# Patient Record
(demographics unavailable — no encounter records)

---

## 2024-10-17 NOTE — PHYSICAL EXAM
[Alert] : alert [Oriented x 3] : ~L oriented x 3 [Well Nourished] : well nourished [Conjunctiva Non-injected] : conjunctiva non-injected [No Visual Lymphadenopathy] : no visual  lymphadenopathy [No Clubbing] : no clubbing [No Edema] : no edema [No Bromhidrosis] : no bromhidrosis [No Chromhidrosis] : no chromhidrosis [FreeTextEntry3] : L lateral forehead - 0.7x0.7 cm pink atrophic bx site

## 2024-10-17 NOTE — HISTORY OF PRESENT ILLNESS
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 Barahona   We had the pleasure of seeing your patient in consultation for Mohs Micrographic Surgery. Mr. 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 Moraels Hx of Mohs 20 years ago and 40 years ago Lives on [place], but able to drive now   Pertinent positives noted below History of HIV or hepatitis: No Blood thinners: ASA 81  Antibiotic Prophylaxis: None Medical implants: None   The patient's review of systems questionnaire was reviewed. Education needs were identified. There were no barriers to learning.

## 2024-10-17 NOTE — HISTORY OF PRESENT ILLNESS
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 Barahona   We had the pleasure of seeing your patient in consultation for Mohs Micrographic Surgery. Mr. 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 Morales Hx of Mohs 20 years ago and 40 years ago Lives on [place], but able to drive now   Pertinent positives noted below History of HIV or hepatitis: No Blood thinners: ASA 81  Antibiotic Prophylaxis: None Medical implants: None   The patient's review of systems questionnaire was reviewed. Education needs were identified. There were no barriers to learning.

## 2025-05-14 NOTE — HISTORY OF PRESENT ILLNESS
[FreeTextEntry1] : Patient is a 73yo M with CAD s/p CABG (LIMA to LAD, SVG to OM and diagonal) 2017, HTN, HLD, GERD, esophageal cancer s/p ablation, former smoker here for cardiac follow up.   Patient has been doing well without any chest pain or shortness of breath.  Patient denies PND/orthopnea/edema/palpitations/syncope/claudication . Stays active still out in [facility], a bit less due to wife's illness.  Retired, was . Lives with wife who has developed Parkinsons. Spending most of time in [place] since pandemic, has place in [place].   ROS: GI and  negative

## 2025-05-14 NOTE — DISCUSSION/SUMMARY
[EKG obtained to assist in diagnosis and management of assessed problem(s)] : EKG obtained to assist in diagnosis and management of assessed problem(s) [FreeTextEntry1] : Patient is a 73yo M with CAD s/p CABG after abnormal nuclear stress (LIMA to LAD, SVG to OM and diagonal) 2017, HTN, HLD former smoker here for cardiac follow up. Also h/o esophageal cancer in 2009 s/p ablation only without recurrence.   CAD -Admitted to [facility] in 2017 after positive nuclear stress test, cath with LM and MV disease so had CABG. Developed post-op AF. Also put on colchicine post-op for pericarditis. -Echo 6/2024 with improved EF to 55-60%, mild RVE  -Nuclear stress test negative 5/2022  -No anginal symptoms at this time, a bit less active lately though.   CAROTID STENOSIS: -Carotid with stable mild stenosis 6/2024, will arrange surveillance at follow up   HLD -Had been having cramps on statin, LDL mildly above goal of < 70 from 2/2025 as well  -Needs to take his statin daily as had cut back  HTN -On metoprolol/Ramipril with good control , borderline elevated today but hasnt taken meds yet today  1. CV stable at this time 2. Continue current antihypertensives, blood pressure is overall well controlled  3. Continue medical management of CAD and carotid stenosis 4. Continue ASA/statin, will get on statin daily and re-eval lipids. IF unable to tolerate will cut to 10mg qhs and add zetia 5. Recommend aggressive diet and lifestyle modifications  6. Increase physical activity as tolerated  7. Follow up 6months, echo then to evaluate increased RV size seen last year, carotid then for surveillance. Also check fasting lipids prior to visit

## 2025-05-14 NOTE — ASSESSMENT
[FreeTextEntry1] : ECG: SR, no significant ST-T abnormalities and normal intervals, LAE    (5/2021) A1C 5.7 (5/2021)   HDL 65 LDL 82 TG 75 (2/2025)   HDL 62 LDL 65  (4/2023)  HDL 50 LDL 51 TG 59 (10/2022)  HDL 52 LDL 52  (5/2021)  HDL 50 LDL 56  (2/2020)  PHARM NUCLEAR STRESS TEST 5/2022: 1. Negative ischemia/infarct, EF 63% 2. Normal HR/BP response  CAROTID 6/2024:1-49% stenosis ICA's bilaterally, Antegrade flow in the vertebral arteries bilaterally CAROTID 4/2022:1-49% stenosis ICA's bilaterally, Antegrade flow in the vertebral arteries bilaterally  ECHO 6/2024: 1. Normal LV size and function, EF 55-60% 2. Grade I diastolic dysfunction  3. Mild RUPERTO and RVE 4. RVSP 36mmHg 5. Mild increase RV size and improvement in EF since prior   ECHO 9/2021: 1. Normal LV size and function, EF 50-55% 2. Normal LA 3. Normal RV, mild RUPERTO 4. Trivial MR, mild TR   ECHO 3/2020: 1. Low normal LV function, EF 50% (unchanged from echo 2017) 2. Grade I diastolic dysfunction 3. Mild RVE with low normal systolic function 4. Mild RUPERTO, normal LA 5. Mild TR, RVSP 28mmHg  Aortic Duplex 3/2020: 1. Mild mid abdominal aorta ectasia 2. Prox aorta obscured by bowel  Pre-CABG CATH 2017: 1. LM 60% stenosis 2. pLAD 80% stenosis 3. mLAD 100% stenosis, supplied by collaterals from RCA 4. pLCX 30% stenosis 5. Ramus mild disease 6. pRCA 20% stenosis 7. mRCA 20% stenosis.